# Patient Record
Sex: FEMALE | Race: OTHER | NOT HISPANIC OR LATINO | URBAN - METROPOLITAN AREA
[De-identification: names, ages, dates, MRNs, and addresses within clinical notes are randomized per-mention and may not be internally consistent; named-entity substitution may affect disease eponyms.]

---

## 2020-11-25 ENCOUNTER — WEB ENCOUNTER (OUTPATIENT)
Dept: URBAN - METROPOLITAN AREA CLINIC 40 | Facility: CLINIC | Age: 50
End: 2020-11-25

## 2020-11-25 ENCOUNTER — OFFICE VISIT (OUTPATIENT)
Dept: URBAN - METROPOLITAN AREA CLINIC 40 | Facility: CLINIC | Age: 50
End: 2020-11-25
Payer: COMMERCIAL

## 2020-11-25 DIAGNOSIS — R10.13 DYSPEPSIA: ICD-10-CM

## 2020-11-25 DIAGNOSIS — Z12.11 COLON CANCER SCREENING: ICD-10-CM

## 2020-11-25 PROCEDURE — G8427 DOCREV CUR MEDS BY ELIG CLIN: HCPCS | Performed by: INTERNAL MEDICINE

## 2020-11-25 PROCEDURE — 99203 OFFICE O/P NEW LOW 30 MIN: CPT | Performed by: INTERNAL MEDICINE

## 2020-11-25 PROCEDURE — G8482 FLU IMMUNIZE ORDER/ADMIN: HCPCS | Performed by: INTERNAL MEDICINE

## 2020-11-25 PROCEDURE — G8420 CALC BMI NORM PARAMETERS: HCPCS | Performed by: INTERNAL MEDICINE

## 2020-11-25 PROCEDURE — G9903 PT SCRN TBCO ID AS NON USER: HCPCS | Performed by: INTERNAL MEDICINE

## 2020-11-25 RX ORDER — DICYCLOMINE HYDROCHLORIDE 10 MG/1
1 TABLET CAPSULE ORAL
Qty: 120 TABLET | Refills: 1 | OUTPATIENT
Start: 2020-11-25 | End: 2021-01-24

## 2020-11-25 RX ORDER — SODIUM, POTASSIUM,MAG SULFATES 17.5-3.13G
17.5-13.3-1.6 GM/177ML SOLUTION, RECONSTITUTED, ORAL ORAL
Qty: 354 MILLILITER | Refills: 0 | OUTPATIENT
Start: 2020-11-25 | End: 2020-11-26

## 2020-11-25 RX ORDER — CYANOCOBALAMIN 1000 UG/ML
INJECTION INTRAMUSCULAR; SUBCUTANEOUS
Qty: 0 | Refills: 0 | Status: ACTIVE | COMMUNITY
Start: 1900-01-01

## 2020-11-25 RX ORDER — DEXLANSOPRAZOLE 60 MG/1
TAKE ONE CAPSULE BY MOUTH DAILY CAPSULE, DELAYED RELEASE ORAL
Qty: 30 | Refills: 1 | Status: DISCONTINUED | COMMUNITY
Start: 2016-01-05

## 2020-11-25 RX ORDER — SUCRALFATE 1 G/1
TAKE ONE TABLET BY MOUTH FOUR TIMES A DAY ON AN EMPTY STOMACH 1 HOUR BEFORE MEALS AND AT BEDTIME FOR 30 DAYS TABLET ORAL
Qty: 120 | Refills: 0 | Status: DISCONTINUED | COMMUNITY
Start: 2015-12-21

## 2020-11-25 NOTE — HPI-TODAY'S VISIT:
Ms. Mckeon is a 50-year-old black female seen as a self-referral for dyspepsia.  Patient was last seen in this clinic in 2015 for reflux.  She had an EGD at that time where she had H. pylori negative gastritis and reflux esophagitis.  She was treated with Dexilant and Bentyl and then lost to follow-up.  Patient states that her reflux has improved.  However she is having issues with abdominal bloating and distention.  She will feel spastic activity in her periumbilical area.  She does note constipation intermittently.  There is no blood in the stool.  Pain is improved if she lays flat.  When she does have pain it can last hours to days.  The discomfort is becoming more frequent which is why she is coming for evaluation.  She is never had a screening colonoscopy.

## 2020-11-25 NOTE — PHYSICAL EXAM GASTROINTESTINAL
Abdomen , soft,  mild epigastric tenderness, nondistended , no guarding or rigidity , no masses palpable , normal bowel sounds , Liver and Spleen , no hepatomegaly present , no hepatosplenomegaly , liver nontender , spleen not palpable

## 2020-12-07 ENCOUNTER — OFFICE VISIT (OUTPATIENT)
Dept: URBAN - METROPOLITAN AREA CLINIC 40 | Facility: CLINIC | Age: 50
End: 2020-12-07

## 2021-01-11 ENCOUNTER — OFFICE VISIT (OUTPATIENT)
Dept: URBAN - METROPOLITAN AREA SURGERY CENTER 30 | Facility: SURGERY CENTER | Age: 51
End: 2021-01-11
Payer: COMMERCIAL

## 2021-01-11 DIAGNOSIS — Z12.11 COLON CANCER SCREENING: ICD-10-CM

## 2021-01-11 PROCEDURE — G9935 CANC NOT DETECTD DURING SRCN: HCPCS | Performed by: INTERNAL MEDICINE

## 2021-01-11 PROCEDURE — G8907 PT DOC NO EVENTS ON DISCHARG: HCPCS | Performed by: INTERNAL MEDICINE

## 2021-01-11 PROCEDURE — G0121 COLON CA SCRN NOT HI RSK IND: HCPCS | Performed by: INTERNAL MEDICINE

## 2021-01-27 ENCOUNTER — OFFICE VISIT (OUTPATIENT)
Dept: URBAN - METROPOLITAN AREA CLINIC 40 | Facility: CLINIC | Age: 51
End: 2021-01-27

## 2021-01-27 RX ORDER — CYANOCOBALAMIN 1000 UG/ML
INJECTION INTRAMUSCULAR; SUBCUTANEOUS
Qty: 0 | Refills: 0 | COMMUNITY
Start: 1900-01-01

## 2021-08-25 ENCOUNTER — TELEPHONE ENCOUNTER (OUTPATIENT)
Dept: URBAN - METROPOLITAN AREA CLINIC 40 | Facility: CLINIC | Age: 51
End: 2021-08-25

## 2021-08-25 RX ORDER — DICYCLOMINE HYDROCHLORIDE 10 MG/1
1 TABLET CAPSULE ORAL THREE TIMES A DAY
Qty: 90 | Refills: 0 | OUTPATIENT
Start: 2021-08-26 | End: 2021-09-25

## 2021-09-17 ENCOUNTER — TELEPHONE ENCOUNTER (OUTPATIENT)
Dept: URBAN - METROPOLITAN AREA CLINIC 74 | Facility: CLINIC | Age: 51
End: 2021-09-17

## 2021-09-17 ENCOUNTER — OFFICE VISIT (OUTPATIENT)
Dept: URBAN - METROPOLITAN AREA TELEHEALTH 2 | Facility: TELEHEALTH | Age: 51
End: 2021-09-17
Payer: COMMERCIAL

## 2021-09-17 DIAGNOSIS — K64.4 INTERNAL AND EXTERNAL HEMORRHOIDS WITHOUT COMPLICATION: ICD-10-CM

## 2021-09-17 DIAGNOSIS — R10.13 DYSPEPSIA: ICD-10-CM

## 2021-09-17 DIAGNOSIS — K57.30 DIVERTICULA OF COLON: ICD-10-CM

## 2021-09-17 PROBLEM — 398050005 DIVERTICULAR DISEASE OF COLON: Status: ACTIVE | Noted: 2021-09-17

## 2021-09-17 PROCEDURE — 99213 OFFICE O/P EST LOW 20 MIN: CPT | Performed by: PHYSICIAN ASSISTANT

## 2021-09-17 RX ORDER — DICYCLOMINE HYDROCHLORIDE 20 MG/1
1 TABLET TABLET ORAL THREE TIMES A DAY
Qty: 90 | Refills: 1
Start: 2021-08-26 | End: 2021-11-15

## 2021-09-17 RX ORDER — DICYCLOMINE HYDROCHLORIDE 10 MG/1
1 TABLET CAPSULE ORAL THREE TIMES A DAY
Qty: 90 | Refills: 0 | Status: ACTIVE | COMMUNITY
Start: 2021-08-26 | End: 2021-09-25

## 2021-09-17 RX ORDER — CYANOCOBALAMIN 1000 UG/ML
INJECTION INTRAMUSCULAR; SUBCUTANEOUS
Qty: 0 | Refills: 0 | Status: ACTIVE | COMMUNITY
Start: 1900-01-01

## 2021-09-17 NOTE — HPI-TODAY'S VISIT:
Ms. Mckeon is a 51-year-old black female seen as a self-referral for dyspepsia.  Patient has history of reflux.  She had an EGD in 2015 where she had H. pylori negative gastritis and reflux esophagitis.  She was treated with Dexilant and Bentyl and then lost to follow-up.  Patient states that her reflux has improved.  However she is having issues with abdominal bloating and distention.  She will feel spastic activity in her periumbilical area.  She does note constipation intermittently.  There is no blood in the stool.  Pain is improved if she lays flat.  When she does have pain it can last hours to days.  The discomfort is becoming more frequent which is why she is coming for evaluation.  Normal screening colonoscopy 1/11/21. Recently had cosmetic surgery and states it was painful. Had "stomach issues" since age of 17. History of cholecystectomy. Has post prandial bloating. Denies vomiting or dysphagia. Reports painful bloating. Tried digestive enzymes, teas and taking laxatives post-op, especially after use of pain meds. Takes a probiotic daily, until recent antibiotic use. Lost >60 pounds in the last 2 years. Eating low-carb, animal protein diet. Has not been taking the dicyclomine. Ibuprofen only as needed. Crystal light during the day. Tummy tuck with muscle pain.

## 2024-02-26 ENCOUNTER — OV NP (OUTPATIENT)
Dept: URBAN - METROPOLITAN AREA CLINIC 12 | Facility: CLINIC | Age: 54
End: 2024-02-26
Payer: COMMERCIAL

## 2024-02-26 VITALS
WEIGHT: 167 LBS | TEMPERATURE: 98.1 F | HEART RATE: 71 BPM | DIASTOLIC BLOOD PRESSURE: 100 MMHG | BODY MASS INDEX: 27.82 KG/M2 | HEIGHT: 65 IN | SYSTOLIC BLOOD PRESSURE: 149 MMHG

## 2024-02-26 DIAGNOSIS — K62.5 RECTAL BLEEDING: ICD-10-CM

## 2024-02-26 DIAGNOSIS — K64.4 EXTERNAL HEMORRHOID: ICD-10-CM

## 2024-02-26 DIAGNOSIS — K59.09 CHRONIC CONSTIPATION: ICD-10-CM

## 2024-02-26 DIAGNOSIS — K62.89 PERIANAL PAIN: ICD-10-CM

## 2024-02-26 PROCEDURE — 99214 OFFICE O/P EST MOD 30 MIN: CPT | Performed by: STUDENT IN AN ORGANIZED HEALTH CARE EDUCATION/TRAINING PROGRAM

## 2024-02-26 RX ORDER — HYDROCORTISONE 25 MG/G
1 APPLICATION CREAM TOPICAL TWICE A DAY
Qty: 1 TUBE | Refills: 3 | OUTPATIENT
Start: 2024-02-26 | End: 2024-04-22

## 2024-02-26 RX ORDER — CYANOCOBALAMIN 1000 UG/ML
INJECTION INTRAMUSCULAR; SUBCUTANEOUS
Qty: 0 | Refills: 0 | Status: ON HOLD | COMMUNITY
Start: 1900-01-01

## 2024-02-26 RX ORDER — LINACLOTIDE 145 UG/1
1 CAPSULE AT LEAST 30 MINUTES BEFORE THE FIRST MEAL OF THE DAY ON AN EMPTY STOMACH CAPSULE, GELATIN COATED ORAL ONCE A DAY
Qty: 30 | Refills: 11 | OUTPATIENT
Start: 2024-02-26 | End: 2025-02-20

## 2024-02-26 RX ORDER — LIDOCAINE 50 MG/G
1 APPLICATION AS NEEDED OINTMENT TOPICAL THREE TIMES A DAY
Qty: 1 TUBE | Refills: 2 | OUTPATIENT
Start: 2024-02-26

## 2024-02-26 NOTE — HPI-TODAY'S VISIT:
52 yo F here for eval of perianal pain, constipation, rectal bleeding.  She says that she started semaglutide appx 1 year ago.  Has chronic constipation made worse by the medication. Appx 2 weeks ago started having perianal pain as well as rectal bleeding. Tried epsom salt baths and hemorrhoid cream OTC without relief.  Severe constipation.  Will take MOM 1-2x a week to precipitate a BM. If she does not take it then will not have a BM. Sensation of incomplete evacuation. This was worsened in 2015 after she had a hysterectomy. her ObGYN had given her amitiza, which helped for a while and she ultimately discontinued.  In November 2023 went to [facility] with abd pain, was found to be constipation.  Her BP today is 149/100 -- she is not taking a medication at this time regularly, but sometimes will take triameterine if she feels it is high. She is going to see her cardiologist and PCP to discuss this further  Colonoscopy 2021 reviewed -- diverticulosis, internal and external hemorrhoisd. Recommended to repeat in 10 years

## 2024-04-12 NOTE — PHYSICAL EXAM GASTROINTESTINAL
soft, nontender, nondistended , no guarding or rigidity
Render Risk Assessment In Note?: no
Detail Level: Simple
Additional Notes: Pt is not currently on birth control\\nPt denies using any high blood pressure medications\\n\\nDiscussed adding Spironolactone and Clindamycin to regimen, pt in agreement w plan\\nRecommended pt take single Spironolactone 50 mg tablet once daily and apply Clindamycin gel to face every morning\\n\\nPt to f/u in October for FBE